# Patient Record
Sex: MALE | URBAN - METROPOLITAN AREA
[De-identification: names, ages, dates, MRNs, and addresses within clinical notes are randomized per-mention and may not be internally consistent; named-entity substitution may affect disease eponyms.]

---

## 2021-03-30 ENCOUNTER — APPOINTMENT (OUTPATIENT)
Age: 71
Setting detail: DERMATOLOGY
End: 2021-03-30

## 2021-03-30 NOTE — HPI: COSMETIC (FACIAL)
Additional History: Double Cleanse with exfo wash. Remove with hot towels. Alcohol prep. Microdermabrasion with teal tip 2 passes. Face 12. Eyes 4. Nose 12. Cool towel. Extractions. Cool towel. Zo sulfa mask 10 minutes. Remove with hot towels. Warm 4x4. Brightalive. Daily power. GF face. Avene day. Pt getting  may 8, recommended dermaplane a few days before and additional extractions. Also recommended exfoliating polish.